# Patient Record
Sex: MALE | Race: BLACK OR AFRICAN AMERICAN | NOT HISPANIC OR LATINO | Employment: FULL TIME | URBAN - METROPOLITAN AREA
[De-identification: names, ages, dates, MRNs, and addresses within clinical notes are randomized per-mention and may not be internally consistent; named-entity substitution may affect disease eponyms.]

---

## 2024-01-17 PROBLEM — J45.909 ASTHMA (HHS-HCC): Status: ACTIVE | Noted: 2024-01-17

## 2024-01-17 PROBLEM — D72.819 LEUKOPENIA: Status: ACTIVE | Noted: 2020-12-15

## 2024-01-17 PROBLEM — E55.9 VITAMIN D DEFICIENCY: Status: ACTIVE | Noted: 2024-01-17

## 2024-01-17 ASSESSMENT — PROMIS GLOBAL HEALTH SCALE
RATE_MENTAL_HEALTH: EXCELLENT
CARRYOUT_SOCIAL_ACTIVITIES: EXCELLENT
EMOTIONAL_PROBLEMS: NEVER
RATE_PHYSICAL_HEALTH: VERY GOOD
RATE_QUALITY_OF_LIFE: VERY GOOD
RATE_AVERAGE_FATIGUE: MILD
RATE_AVERAGE_PAIN: 1
RATE_SOCIAL_SATISFACTION: EXCELLENT
CARRYOUT_PHYSICAL_ACTIVITIES: COMPLETELY
RATE_GENERAL_HEALTH: VERY GOOD

## 2024-01-18 ENCOUNTER — LAB (OUTPATIENT)
Dept: LAB | Facility: LAB | Age: 59
End: 2024-01-18
Payer: COMMERCIAL

## 2024-01-18 ENCOUNTER — OFFICE VISIT (OUTPATIENT)
Dept: PRIMARY CARE | Facility: CLINIC | Age: 59
End: 2024-01-18
Payer: COMMERCIAL

## 2024-01-18 VITALS
SYSTOLIC BLOOD PRESSURE: 118 MMHG | DIASTOLIC BLOOD PRESSURE: 80 MMHG | BODY MASS INDEX: 40.53 KG/M2 | TEMPERATURE: 97.6 F | RESPIRATION RATE: 16 BRPM | OXYGEN SATURATION: 95 % | HEART RATE: 62 BPM | WEIGHT: 252.2 LBS | HEIGHT: 66 IN

## 2024-01-18 DIAGNOSIS — Z12.5 PROSTATE CANCER SCREENING: ICD-10-CM

## 2024-01-18 DIAGNOSIS — J45.20 MILD INTERMITTENT ASTHMA WITHOUT COMPLICATION (HHS-HCC): ICD-10-CM

## 2024-01-18 DIAGNOSIS — Z23 FLU VACCINE NEED: ICD-10-CM

## 2024-01-18 DIAGNOSIS — Z00.00 ANNUAL PHYSICAL EXAM: Primary | ICD-10-CM

## 2024-01-18 DIAGNOSIS — E55.9 VITAMIN D DEFICIENCY: ICD-10-CM

## 2024-01-18 DIAGNOSIS — E66.01 CLASS 3 SEVERE OBESITY WITHOUT SERIOUS COMORBIDITY WITH BODY MASS INDEX (BMI) OF 40.0 TO 44.9 IN ADULT, UNSPECIFIED OBESITY TYPE (MULTI): ICD-10-CM

## 2024-01-18 LAB
25(OH)D3 SERPL-MCNC: 18 NG/ML (ref 30–100)
ALBUMIN SERPL BCP-MCNC: 4.4 G/DL (ref 3.4–5)
ALP SERPL-CCNC: 47 U/L (ref 33–120)
ALT SERPL W P-5'-P-CCNC: 42 U/L (ref 10–52)
ANION GAP SERPL CALC-SCNC: 13 MMOL/L (ref 10–20)
AST SERPL W P-5'-P-CCNC: 24 U/L (ref 9–39)
BASOPHILS # BLD AUTO: 0.03 X10*3/UL (ref 0–0.1)
BASOPHILS NFR BLD AUTO: 0.8 %
BILIRUB SERPL-MCNC: 0.6 MG/DL (ref 0–1.2)
BUN SERPL-MCNC: 12 MG/DL (ref 6–23)
CALCIUM SERPL-MCNC: 9.6 MG/DL (ref 8.6–10.6)
CHLORIDE SERPL-SCNC: 105 MMOL/L (ref 98–107)
CHOLEST SERPL-MCNC: 196 MG/DL (ref 0–199)
CHOLESTEROL/HDL RATIO: 3.4
CO2 SERPL-SCNC: 27 MMOL/L (ref 21–32)
CREAT SERPL-MCNC: 0.94 MG/DL (ref 0.5–1.3)
EGFRCR SERPLBLD CKD-EPI 2021: >90 ML/MIN/1.73M*2
EOSINOPHIL # BLD AUTO: 0.27 X10*3/UL (ref 0–0.7)
EOSINOPHIL NFR BLD AUTO: 7 %
ERYTHROCYTE [DISTWIDTH] IN BLOOD BY AUTOMATED COUNT: 13.8 % (ref 11.5–14.5)
EST. AVERAGE GLUCOSE BLD GHB EST-MCNC: 114 MG/DL
GLUCOSE SERPL-MCNC: 93 MG/DL (ref 74–99)
HBA1C MFR BLD: 5.6 %
HCT VFR BLD AUTO: 43.4 % (ref 41–52)
HDLC SERPL-MCNC: 57.9 MG/DL
HGB BLD-MCNC: 14.5 G/DL (ref 13.5–17.5)
IMM GRANULOCYTES # BLD AUTO: 0.01 X10*3/UL (ref 0–0.7)
IMM GRANULOCYTES NFR BLD AUTO: 0.3 % (ref 0–0.9)
LDLC SERPL CALC-MCNC: 127 MG/DL
LYMPHOCYTES # BLD AUTO: 1.86 X10*3/UL (ref 1.2–4.8)
LYMPHOCYTES NFR BLD AUTO: 48.2 %
MCH RBC QN AUTO: 31.8 PG (ref 26–34)
MCHC RBC AUTO-ENTMCNC: 33.4 G/DL (ref 32–36)
MCV RBC AUTO: 95 FL (ref 80–100)
MONOCYTES # BLD AUTO: 0.51 X10*3/UL (ref 0.1–1)
MONOCYTES NFR BLD AUTO: 13.2 %
NEUTROPHILS # BLD AUTO: 1.18 X10*3/UL (ref 1.2–7.7)
NEUTROPHILS NFR BLD AUTO: 30.5 %
NON HDL CHOLESTEROL: 138 MG/DL (ref 0–149)
NRBC BLD-RTO: 0 /100 WBCS (ref 0–0)
PLATELET # BLD AUTO: 199 X10*3/UL (ref 150–450)
POTASSIUM SERPL-SCNC: 4.2 MMOL/L (ref 3.5–5.3)
PROT SERPL-MCNC: 7.7 G/DL (ref 6.4–8.2)
PSA SERPL-MCNC: 0.3 NG/ML
RBC # BLD AUTO: 4.56 X10*6/UL (ref 4.5–5.9)
SODIUM SERPL-SCNC: 141 MMOL/L (ref 136–145)
TRIGL SERPL-MCNC: 55 MG/DL (ref 0–149)
VLDL: 11 MG/DL (ref 0–40)
WBC # BLD AUTO: 3.9 X10*3/UL (ref 4.4–11.3)

## 2024-01-18 PROCEDURE — 90471 IMMUNIZATION ADMIN: CPT | Performed by: STUDENT IN AN ORGANIZED HEALTH CARE EDUCATION/TRAINING PROGRAM

## 2024-01-18 PROCEDURE — 84153 ASSAY OF PSA TOTAL: CPT

## 2024-01-18 PROCEDURE — 85025 COMPLETE CBC W/AUTO DIFF WBC: CPT

## 2024-01-18 PROCEDURE — 83036 HEMOGLOBIN GLYCOSYLATED A1C: CPT

## 2024-01-18 PROCEDURE — 4004F PT TOBACCO SCREEN RCVD TLK: CPT | Performed by: STUDENT IN AN ORGANIZED HEALTH CARE EDUCATION/TRAINING PROGRAM

## 2024-01-18 PROCEDURE — 82306 VITAMIN D 25 HYDROXY: CPT

## 2024-01-18 PROCEDURE — 3008F BODY MASS INDEX DOCD: CPT | Performed by: STUDENT IN AN ORGANIZED HEALTH CARE EDUCATION/TRAINING PROGRAM

## 2024-01-18 PROCEDURE — 80061 LIPID PANEL: CPT

## 2024-01-18 PROCEDURE — 90686 IIV4 VACC NO PRSV 0.5 ML IM: CPT | Performed by: STUDENT IN AN ORGANIZED HEALTH CARE EDUCATION/TRAINING PROGRAM

## 2024-01-18 PROCEDURE — 80053 COMPREHEN METABOLIC PANEL: CPT

## 2024-01-18 PROCEDURE — 99396 PREV VISIT EST AGE 40-64: CPT | Performed by: STUDENT IN AN ORGANIZED HEALTH CARE EDUCATION/TRAINING PROGRAM

## 2024-01-18 PROCEDURE — 36415 COLL VENOUS BLD VENIPUNCTURE: CPT

## 2024-01-18 RX ORDER — ALBUTEROL SULFATE 90 UG/1
1 AEROSOL, METERED RESPIRATORY (INHALATION) EVERY 4 HOURS PRN
COMMUNITY
Start: 2020-04-30 | End: 2024-01-18 | Stop reason: SDUPTHER

## 2024-01-18 RX ORDER — ACETAMINOPHEN 500 MG
1 TABLET ORAL DAILY
COMMUNITY
Start: 2022-01-07

## 2024-01-18 RX ORDER — ALBUTEROL SULFATE 90 UG/1
1 AEROSOL, METERED RESPIRATORY (INHALATION) EVERY 4 HOURS PRN
Qty: 18 G | Refills: 2 | Status: SHIPPED | OUTPATIENT
Start: 2024-01-18

## 2024-01-18 ASSESSMENT — PATIENT HEALTH QUESTIONNAIRE - PHQ9
2. FEELING DOWN, DEPRESSED OR HOPELESS: NOT AT ALL
1. LITTLE INTEREST OR PLEASURE IN DOING THINGS: NOT AT ALL
SUM OF ALL RESPONSES TO PHQ9 QUESTIONS 1 AND 2: 0

## 2024-01-18 NOTE — PATIENT INSTRUCTIONS
Flu shot today.   Continue with current medications.  Blood work before your next visit.  If you receive medical information from My Chart, your results will be released into your online chart. This means you may view or see results before someone from our office contact you directly.  Please keep in mind that if blood work or imaging were ordered during your visit, all the nonurgent lab results will be discussed with you at your next office visit.  Please arrive 15 minutes before your appointment.   Return to office in 12 months for annual physical or as needed

## 2024-01-18 NOTE — PROGRESS NOTES
Subjective   Patient ID: Rachid Vargas is a pleasant 58 y.o. male w hx of asthma, HTN, who presents for Annual Exam.  HPI    Health Maintenance:  -   Colonoscopy: cologaurd , negative   -  Prostate Cancer Screenin  - AAA Screening: at 65  - Lung Cancer Screening: NA     Immunizations:  - COVID vaccination status:  - Influenza: Today   - Shingles: UTD   - TDAP: recommended   - Pneumo Vaccine: recommended     Review of Systems   All other systems reviewed and are negative.      Visit Vitals  /80   Pulse 62   Temp 36.4 °C (97.6 °F)   Resp 16          Objective   Physical Exam  Constitutional:       General: He is not in acute distress.     Appearance: Normal appearance. He is obese.   HENT:      Head: Normocephalic and atraumatic.   Eyes:      General: No scleral icterus.     Conjunctiva/sclera: Conjunctivae normal.   Cardiovascular:      Rate and Rhythm: Normal rate and regular rhythm.      Heart sounds: Normal heart sounds.   Pulmonary:      Effort: Pulmonary effort is normal.      Breath sounds: Normal breath sounds. No wheezing.   Abdominal:      General: Bowel sounds are normal. There is no distension.      Palpations: Abdomen is soft.      Tenderness: There is no abdominal tenderness.   Musculoskeletal:      Cervical back: Neck supple.      Right lower leg: No edema.      Left lower leg: No edema.   Lymphadenopathy:      Cervical: No cervical adenopathy.   Skin:     General: Skin is warm and dry.   Neurological:      General: No focal deficit present.      Mental Status: He is alert and oriented to person, place, and time.   Psychiatric:         Mood and Affect: Mood normal.         Behavior: Behavior normal.         Assessment/Plan   Problem List Items Addressed This Visit       Asthma     Stable. No recent exacerbation          Relevant Medications    albuterol 90 mcg/actuation inhaler    Vitamin D deficiency    Relevant Orders    Vitamin D 25-Hydroxy,Total (for eval of Vitamin D levels)      Other Visit Diagnoses       Annual physical exam    -  Primary    Prostate cancer screening        Relevant Orders    Prostate Spec.Ag,Screen    Class 3 severe obesity without serious comorbidity with body mass index (BMI) of 40.0 to 44.9 in adult, unspecified obesity type (CMS/HCC)        Relevant Orders    Comprehensive Metabolic Panel    CBC and Auto Differential    Hemoglobin A1C    Lipid Panel    Flu vaccine need        Relevant Orders    Flu vaccine (IIV4) age 6 months and greater, preservative free (Completed)

## 2024-01-19 DIAGNOSIS — E55.9 VITAMIN D DEFICIENCY: Primary | ICD-10-CM

## 2024-01-19 RX ORDER — ERGOCALCIFEROL 1.25 MG/1
50000 CAPSULE ORAL
Qty: 8 CAPSULE | Refills: 0 | Status: SHIPPED | OUTPATIENT
Start: 2024-01-19 | End: 2024-03-15

## 2024-09-10 ENCOUNTER — APPOINTMENT (OUTPATIENT)
Dept: PRIMARY CARE | Facility: CLINIC | Age: 59
End: 2024-09-10
Payer: COMMERCIAL

## 2024-09-10 ENCOUNTER — LAB (OUTPATIENT)
Dept: LAB | Facility: LAB | Age: 59
End: 2024-09-10
Payer: COMMERCIAL

## 2024-09-10 VITALS
HEART RATE: 63 BPM | WEIGHT: 246 LBS | SYSTOLIC BLOOD PRESSURE: 118 MMHG | DIASTOLIC BLOOD PRESSURE: 72 MMHG | HEIGHT: 66 IN | BODY MASS INDEX: 39.53 KG/M2

## 2024-09-10 DIAGNOSIS — E66.09 CLASS 2 OBESITY DUE TO EXCESS CALORIES WITHOUT SERIOUS COMORBIDITY WITH BODY MASS INDEX (BMI) OF 39.0 TO 39.9 IN ADULT: ICD-10-CM

## 2024-09-10 DIAGNOSIS — Z13.29 SCREENING FOR THYROID DISORDER: ICD-10-CM

## 2024-09-10 DIAGNOSIS — D72.819 LEUKOPENIA, UNSPECIFIED TYPE: ICD-10-CM

## 2024-09-10 DIAGNOSIS — Z13.89 SCREENING FOR OBESITY: ICD-10-CM

## 2024-09-10 DIAGNOSIS — Z13.220 SCREENING FOR HYPERLIPIDEMIA: ICD-10-CM

## 2024-09-10 DIAGNOSIS — R07.89 OTHER CHEST PAIN: Primary | Chronic | ICD-10-CM

## 2024-09-10 DIAGNOSIS — Z13.6 SCREENING, ISCHEMIC HEART DISEASE: ICD-10-CM

## 2024-09-10 DIAGNOSIS — Z13.1 SCREENING FOR DIABETES MELLITUS: ICD-10-CM

## 2024-09-10 PROBLEM — E66.812 CLASS 2 OBESITY DUE TO EXCESS CALORIES WITHOUT SERIOUS COMORBIDITY WITH BODY MASS INDEX (BMI) OF 39.0 TO 39.9 IN ADULT: Status: ACTIVE | Noted: 2024-09-10

## 2024-09-10 LAB
BASOPHILS # BLD AUTO: 0.03 X10*3/UL (ref 0–0.1)
BASOPHILS NFR BLD AUTO: 0.8 %
EOSINOPHIL # BLD AUTO: 0.18 X10*3/UL (ref 0–0.7)
EOSINOPHIL NFR BLD AUTO: 4.8 %
ERYTHROCYTE [DISTWIDTH] IN BLOOD BY AUTOMATED COUNT: 13.2 % (ref 11.5–14.5)
EST. AVERAGE GLUCOSE BLD GHB EST-MCNC: 114 MG/DL
HBA1C MFR BLD: 5.6 %
HCT VFR BLD AUTO: 40.8 % (ref 41–52)
HGB BLD-MCNC: 13.8 G/DL (ref 13.5–17.5)
IMM GRANULOCYTES # BLD AUTO: 0 X10*3/UL (ref 0–0.7)
IMM GRANULOCYTES NFR BLD AUTO: 0 % (ref 0–0.9)
LYMPHOCYTES # BLD AUTO: 1.97 X10*3/UL (ref 1.2–4.8)
LYMPHOCYTES NFR BLD AUTO: 52.4 %
MCH RBC QN AUTO: 31.9 PG (ref 26–34)
MCHC RBC AUTO-ENTMCNC: 33.8 G/DL (ref 32–36)
MCV RBC AUTO: 94 FL (ref 80–100)
MONOCYTES # BLD AUTO: 0.43 X10*3/UL (ref 0.1–1)
MONOCYTES NFR BLD AUTO: 11.4 %
NEUTROPHILS # BLD AUTO: 1.15 X10*3/UL (ref 1.2–7.7)
NEUTROPHILS NFR BLD AUTO: 30.6 %
NRBC BLD-RTO: 0 /100 WBCS (ref 0–0)
PLATELET # BLD AUTO: 178 X10*3/UL (ref 150–450)
RBC # BLD AUTO: 4.33 X10*6/UL (ref 4.5–5.9)
WBC # BLD AUTO: 3.8 X10*3/UL (ref 4.4–11.3)

## 2024-09-10 PROCEDURE — 36415 COLL VENOUS BLD VENIPUNCTURE: CPT

## 2024-09-10 PROCEDURE — 84443 ASSAY THYROID STIM HORMONE: CPT

## 2024-09-10 PROCEDURE — 80061 LIPID PANEL: CPT

## 2024-09-10 PROCEDURE — 3008F BODY MASS INDEX DOCD: CPT | Performed by: INTERNAL MEDICINE

## 2024-09-10 PROCEDURE — 85025 COMPLETE CBC W/AUTO DIFF WBC: CPT

## 2024-09-10 PROCEDURE — 4004F PT TOBACCO SCREEN RCVD TLK: CPT | Performed by: INTERNAL MEDICINE

## 2024-09-10 PROCEDURE — 93000 ELECTROCARDIOGRAM COMPLETE: CPT | Performed by: INTERNAL MEDICINE

## 2024-09-10 PROCEDURE — 99213 OFFICE O/P EST LOW 20 MIN: CPT | Performed by: INTERNAL MEDICINE

## 2024-09-10 PROCEDURE — 83036 HEMOGLOBIN GLYCOSYLATED A1C: CPT

## 2024-09-10 ASSESSMENT — PATIENT HEALTH QUESTIONNAIRE - PHQ9
1. LITTLE INTEREST OR PLEASURE IN DOING THINGS: NOT AT ALL
SUM OF ALL RESPONSES TO PHQ9 QUESTIONS 1 AND 2: 0
2. FEELING DOWN, DEPRESSED OR HOPELESS: NOT AT ALL

## 2024-09-10 ASSESSMENT — ENCOUNTER SYMPTOMS: CONSTITUTIONAL NEGATIVE: 1

## 2024-09-10 NOTE — PROGRESS NOTES
"Patient ID: Rachid Vargas is a 59 y.o. male who presents for New Patient Visit (Urgent Care follow up).    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    All other systems reviewed and are negative.      Objective     Physical Exam  Vitals reviewed.   Constitutional:       Appearance: He is obese.   Neck:      Vascular: No carotid bruit.   Cardiovascular:      Rate and Rhythm: Normal rate and regular rhythm.      Pulses: Normal pulses.      Heart sounds: Normal heart sounds. No murmur heard.  Pulmonary:      Effort: Pulmonary effort is normal.      Breath sounds: Normal breath sounds.      Comments: NO CHEST WALL TENDERNESS  Abdominal:      Comments: obese   Musculoskeletal:      Right lower leg: No edema.      Left lower leg: No edema.   Skin:     Capillary Refill: Capillary refill takes more than 3 seconds.   Neurological:      General: No focal deficit present.      Mental Status: He is oriented to person, place, and time. Mental status is at baseline.   Psychiatric:         Mood and Affect: Mood normal.         Behavior: Behavior normal.         Thought Content: Thought content normal.         Judgment: Judgment normal.         Lab Results   Component Value Date    WBC 3.9 (L) 01/18/2024    HGB 14.5 01/18/2024    HCT 43.4 01/18/2024    MCV 95 01/18/2024     01/18/2024           Problem List Items Addressed This Visit       Leukopenia    Relevant Orders    CBC and Auto " Differential    Class 2 obesity due to excess calories without serious comorbidity with body mass index (BMI) of 39.0 to 39.9 in adult    Screening for obesity     Other Visit Diagnoses       Other chest pain  (Chronic)   -  Primary    Relevant Orders    Stress Test    Screening for hyperlipidemia        Relevant Orders    Lipid panel    Screening for diabetes mellitus        Relevant Orders    Hemoglobin A1c    Screening, ischemic heart disease        Relevant Orders    CT cardiac scoring wo IV contrast               A/P         EKG NORMAL A1C  CMP, LIPID, TSH   CARDIAC STRESS TEST   CARDIAC CT W/O IV CONTRAST   FOLLOW UP IF NEEDED

## 2024-09-11 ENCOUNTER — CLINICAL SUPPORT (OUTPATIENT)
Dept: CARDIOLOGY | Facility: CLINIC | Age: 59
End: 2024-09-11
Payer: COMMERCIAL

## 2024-09-11 DIAGNOSIS — R07.89 OTHER CHEST PAIN: Chronic | ICD-10-CM

## 2024-09-11 LAB
CHOLEST SERPL-MCNC: 176 MG/DL (ref 0–199)
CHOLESTEROL/HDL RATIO: 2.9
HDLC SERPL-MCNC: 61.7 MG/DL
LDLC SERPL CALC-MCNC: 105 MG/DL
NON HDL CHOLESTEROL: 114 MG/DL (ref 0–149)
TRIGL SERPL-MCNC: 49 MG/DL (ref 0–149)
TSH SERPL-ACNC: 1.87 MIU/L (ref 0.44–3.98)
VLDL: 10 MG/DL (ref 0–40)

## 2024-09-11 PROCEDURE — 93017 CV STRESS TEST TRACING ONLY: CPT

## 2024-09-11 PROCEDURE — 93018 CV STRESS TEST I&R ONLY: CPT | Performed by: INTERNAL MEDICINE

## 2024-09-11 PROCEDURE — 93016 CV STRESS TEST SUPVJ ONLY: CPT | Performed by: INTERNAL MEDICINE

## 2024-09-15 ENCOUNTER — HOSPITAL ENCOUNTER (OUTPATIENT)
Dept: RADIOLOGY | Facility: HOSPITAL | Age: 59
Discharge: HOME | End: 2024-09-15
Payer: COMMERCIAL

## 2024-09-15 DIAGNOSIS — Z13.6 SCREENING, ISCHEMIC HEART DISEASE: ICD-10-CM

## 2024-09-15 PROCEDURE — 75571 CT HRT W/O DYE W/CA TEST: CPT

## 2024-10-02 DIAGNOSIS — D80.8 LIGHT CHAIN DISEASE (MULTI): ICD-10-CM

## 2024-10-02 DIAGNOSIS — D72.819 LEUKOPENIA, UNSPECIFIED TYPE: Primary | ICD-10-CM

## 2024-10-02 DIAGNOSIS — D64.9 ANEMIA, UNSPECIFIED TYPE: ICD-10-CM

## 2024-10-10 ENCOUNTER — LAB (OUTPATIENT)
Dept: LAB | Facility: LAB | Age: 59
End: 2024-10-10
Payer: COMMERCIAL

## 2024-10-10 DIAGNOSIS — D64.9 ANEMIA, UNSPECIFIED TYPE: ICD-10-CM

## 2024-10-10 DIAGNOSIS — D72.819 LEUKOPENIA, UNSPECIFIED TYPE: ICD-10-CM

## 2024-10-10 DIAGNOSIS — D80.8 LIGHT CHAIN DISEASE (MULTI): ICD-10-CM

## 2024-10-10 LAB
FERRITIN SERPL-MCNC: 754 NG/ML (ref 20–300)
HGB RETIC QN: 38 PG (ref 28–38)
IMMATURE RETIC FRACTION: 13.6 %
IRON SATN MFR SERPL: 26 % (ref 25–45)
IRON SERPL-MCNC: 100 UG/DL (ref 35–150)
LDH SERPL L TO P-CCNC: 139 U/L (ref 84–246)
PROT SERPL-MCNC: 8 G/DL (ref 6.4–8.2)
RETICS #: 0.09 X10*6/UL (ref 0.02–0.12)
RETICS/RBC NFR AUTO: 2 % (ref 0.5–2)
TIBC SERPL-MCNC: 378 UG/DL (ref 240–445)
UIBC SERPL-MCNC: 278 UG/DL (ref 110–370)
VIT B12 SERPL-MCNC: 495 PG/ML (ref 211–911)

## 2024-10-10 PROCEDURE — 82607 VITAMIN B-12: CPT

## 2024-10-10 PROCEDURE — 85045 AUTOMATED RETICULOCYTE COUNT: CPT

## 2024-10-10 PROCEDURE — 83521 IG LIGHT CHAINS FREE EACH: CPT

## 2024-10-10 PROCEDURE — 83615 LACTATE (LD) (LDH) ENZYME: CPT

## 2024-10-10 PROCEDURE — 36415 COLL VENOUS BLD VENIPUNCTURE: CPT

## 2024-10-10 PROCEDURE — 84155 ASSAY OF PROTEIN SERUM: CPT

## 2024-10-10 PROCEDURE — 86038 ANTINUCLEAR ANTIBODIES: CPT

## 2024-10-10 PROCEDURE — 83540 ASSAY OF IRON: CPT

## 2024-10-10 PROCEDURE — 83550 IRON BINDING TEST: CPT

## 2024-10-10 PROCEDURE — 82728 ASSAY OF FERRITIN: CPT

## 2024-10-10 PROCEDURE — 84165 PROTEIN E-PHORESIS SERUM: CPT

## 2024-10-11 LAB
ANA SER QL HEP2 SUBST: NEGATIVE
KAPPA LC SERPL-MCNC: 2.53 MG/DL (ref 0.33–1.94)
KAPPA LC/LAMBDA SER: 1.72 {RATIO} (ref 0.26–1.65)
LAMBDA LC SERPL-MCNC: 1.47 MG/DL (ref 0.57–2.63)

## 2024-10-13 LAB
ALBUMIN: 4.5 G/DL (ref 3.4–5)
ALPHA 1 GLOBULIN: 0.2 G/DL (ref 0.2–0.6)
ALPHA 2 GLOBULIN: 0.7 G/DL (ref 0.4–1.1)
BETA GLOBULIN: 0.9 G/DL (ref 0.5–1.2)
GAMMA GLOBULIN: 1.6 G/DL (ref 0.5–1.4)
PATH REVIEW-SERUM PROTEIN ELECTROPHORESIS: ABNORMAL
PROTEIN ELECTROPHORESIS COMMENT: ABNORMAL

## 2024-10-14 DIAGNOSIS — D80.8 LIGHT CHAIN DISEASE (MULTI): Primary | ICD-10-CM

## 2025-01-27 ENCOUNTER — APPOINTMENT (OUTPATIENT)
Dept: PRIMARY CARE | Facility: CLINIC | Age: 60
End: 2025-01-27
Payer: COMMERCIAL

## 2025-01-27 ENCOUNTER — OFFICE VISIT (OUTPATIENT)
Dept: HEMATOLOGY/ONCOLOGY | Facility: CLINIC | Age: 60
End: 2025-01-27
Payer: COMMERCIAL

## 2025-01-27 VITALS
HEIGHT: 69 IN | SYSTOLIC BLOOD PRESSURE: 141 MMHG | DIASTOLIC BLOOD PRESSURE: 91 MMHG | BODY MASS INDEX: 36.64 KG/M2 | WEIGHT: 247.36 LBS | RESPIRATION RATE: 18 BRPM | TEMPERATURE: 97 F | OXYGEN SATURATION: 96 % | HEART RATE: 86 BPM

## 2025-01-27 DIAGNOSIS — R79.89 ELEVATED FERRITIN: ICD-10-CM

## 2025-01-27 DIAGNOSIS — D80.8 LIGHT CHAIN DISEASE (MULTI): ICD-10-CM

## 2025-01-27 LAB
BASOPHILS # BLD AUTO: 0.03 X10*3/UL (ref 0–0.1)
BASOPHILS NFR BLD AUTO: 0.7 %
EOSINOPHIL # BLD AUTO: 0.18 X10*3/UL (ref 0–0.7)
EOSINOPHIL NFR BLD AUTO: 4.2 %
ERYTHROCYTE [DISTWIDTH] IN BLOOD BY AUTOMATED COUNT: 13.1 % (ref 11.5–14.5)
HCT VFR BLD AUTO: 41.1 % (ref 41–52)
HGB BLD-MCNC: 14.5 G/DL (ref 13.5–17.5)
IMM GRANULOCYTES # BLD AUTO: 0 X10*3/UL (ref 0–0.7)
IMM GRANULOCYTES NFR BLD AUTO: 0 % (ref 0–0.9)
LYMPHOCYTES # BLD AUTO: 2.1 X10*3/UL (ref 1.2–4.8)
LYMPHOCYTES NFR BLD AUTO: 48.6 %
MCH RBC QN AUTO: 32.8 PG (ref 26–34)
MCHC RBC AUTO-ENTMCNC: 35.3 G/DL (ref 32–36)
MCV RBC AUTO: 93 FL (ref 80–100)
MONOCYTES # BLD AUTO: 0.44 X10*3/UL (ref 0.1–1)
MONOCYTES NFR BLD AUTO: 10.2 %
NEUTROPHILS # BLD AUTO: 1.57 X10*3/UL (ref 1.2–7.7)
NEUTROPHILS NFR BLD AUTO: 36.3 %
NRBC BLD-RTO: 0 /100 WBCS (ref 0–0)
PLATELET # BLD AUTO: 184 X10*3/UL (ref 150–450)
RBC # BLD AUTO: 4.42 X10*6/UL (ref 4.5–5.9)
WBC # BLD AUTO: 4.3 X10*3/UL (ref 4.4–11.3)

## 2025-01-27 PROCEDURE — 99214 OFFICE O/P EST MOD 30 MIN: CPT | Performed by: NURSE PRACTITIONER

## 2025-01-27 PROCEDURE — 84155 ASSAY OF PROTEIN SERUM: CPT | Performed by: NURSE PRACTITIONER

## 2025-01-27 PROCEDURE — 82728 ASSAY OF FERRITIN: CPT | Performed by: NURSE PRACTITIONER

## 2025-01-27 PROCEDURE — 86334 IMMUNOFIX E-PHORESIS SERUM: CPT | Performed by: NURSE PRACTITIONER

## 2025-01-27 PROCEDURE — 85025 COMPLETE CBC W/AUTO DIFF WBC: CPT | Performed by: NURSE PRACTITIONER

## 2025-01-27 PROCEDURE — 83521 IG LIGHT CHAINS FREE EACH: CPT | Performed by: NURSE PRACTITIONER

## 2025-01-27 PROCEDURE — 3008F BODY MASS INDEX DOCD: CPT | Performed by: NURSE PRACTITIONER

## 2025-01-27 PROCEDURE — 99204 OFFICE O/P NEW MOD 45 MIN: CPT | Performed by: NURSE PRACTITIONER

## 2025-01-27 PROCEDURE — 82784 ASSAY IGA/IGD/IGG/IGM EACH: CPT | Performed by: NURSE PRACTITIONER

## 2025-01-27 ASSESSMENT — ENCOUNTER SYMPTOMS
OCCASIONAL FEELINGS OF UNSTEADINESS: 0
DEPRESSION: 0
LOSS OF SENSATION IN FEET: 0

## 2025-01-27 ASSESSMENT — PATIENT HEALTH QUESTIONNAIRE - PHQ9
SUM OF ALL RESPONSES TO PHQ9 QUESTIONS 1 AND 2: 0
2. FEELING DOWN, DEPRESSED OR HOPELESS: NOT AT ALL
1. LITTLE INTEREST OR PLEASURE IN DOING THINGS: NOT AT ALL

## 2025-01-27 ASSESSMENT — COLUMBIA-SUICIDE SEVERITY RATING SCALE - C-SSRS
2. HAVE YOU ACTUALLY HAD ANY THOUGHTS OF KILLING YOURSELF?: NO
1. IN THE PAST MONTH, HAVE YOU WISHED YOU WERE DEAD OR WISHED YOU COULD GO TO SLEEP AND NOT WAKE UP?: NO
6. HAVE YOU EVER DONE ANYTHING, STARTED TO DO ANYTHING, OR PREPARED TO DO ANYTHING TO END YOUR LIFE?: NO

## 2025-01-27 ASSESSMENT — PAIN SCALES - GENERAL: PAINLEVEL_OUTOF10: 0-NO PAIN

## 2025-01-27 NOTE — PROGRESS NOTES
"Patient ID: Rachid Vargas is a 59 y.o. male.  Referring Physician: Jay Steele MD  [address]  [address]  [place] [address]  Primary Care Provider: Estrella Kaplan MD  Visit Type: Initial Visit      Subjective    HPI  61yo referral for \"light chain\" with labs 10/10/24 kappa 2.53, ratio 1.72.  CESILIA negative, ferritan 754, iron 100, % sat 16 and B12 of 495.   Prior to that 9/10/24 WBC 3.8, hgb 13.8 and plt 175.  He states his WBC has been low for over 30 years.  He is completely asymptomatic with good energy, no weight loss, no headaches, no night sweats and no recurrent infections.  He denies back and bone pain.  He denies cough, congestion, abdominal pain, bloating, diarrhea or constipation.     Review of Systems - Oncology Asymptomatic    Objective   BSA: 2.33 meters squared  BP (!) 141/91 (BP Location: Left arm, Patient Position: Sitting, BP Cuff Size: Large adult long) Comment: stef aware  Pulse 86   Temp 36.1 °C (97 °F) (Temporal)   Resp 18   Ht 1.743 m (5' 8.62\")   Wt 112 kg (247 lb 5.7 oz)   SpO2 96%   BMI 36.93 kg/m²      has a past medical history of Other specified health status.   has a past surgical history that includes Other surgical history (01/04/2022).  No family history on file.      Rachid Vargas  reports that he has been smoking cigars. He has never used smokeless tobacco.  He  reports that he does not currently use alcohol.  He  reports no history of drug use.    Physical Exam  Constitutional:       Appearance: Normal appearance.   Eyes:      Conjunctiva/sclera: Conjunctivae normal.      Pupils: Pupils are equal, round, and reactive to light.   Cardiovascular:      Rate and Rhythm: Normal rate and regular rhythm.      Pulses: Normal pulses.      Heart sounds: Normal heart sounds. No murmur heard.  Pulmonary:      Effort: Pulmonary effort is normal. No respiratory distress.      Breath sounds: Normal breath sounds. No stridor. No wheezing or rhonchi.   Abdominal:      " General: There is no distension.      Palpations: Abdomen is soft.      Tenderness: There is no abdominal tenderness.   Musculoskeletal:         General: No swelling, tenderness or deformity. Normal range of motion.   Lymphadenopathy:      Cervical: No cervical adenopathy.   Skin:     General: Skin is dry.      Coloration: Skin is not jaundiced or pale.      Findings: No bruising or erythema.   Neurological:      Motor: No weakness.     WBC   Date/Time Value Ref Range Status   09/10/2024 02:32 PM 3.8 (L) 4.4 - 11.3 x10*3/uL Final   01/18/2024 07:52 AM 3.9 (L) 4.4 - 11.3 x10*3/uL Final   01/05/2023 09:00 AM 3.4 (L) 4.4 - 11.3 x10E9/L Final   01/04/2022 09:00 AM 4.0 (L) 4.4 - 11.3 x10E9/L Final     nRBC   Date Value Ref Range Status   09/10/2024 0.0 0.0 - 0.0 /100 WBCs Final   01/18/2024 0.0 0.0 - 0.0 /100 WBCs Final   01/05/2023 0.0 0.0 - 0.0 /100 WBC Final   01/04/2022 0.0 0.0 - 0.0 /100 WBC Final     RBC   Date Value Ref Range Status   09/10/2024 4.33 (L) 4.50 - 5.90 x10*6/uL Final   01/18/2024 4.56 4.50 - 5.90 x10*6/uL Final   01/05/2023 4.28 (L) 4.50 - 5.90 x10E12/L Final   01/04/2022 4.34 (L) 4.50 - 5.90 x10E12/L Final     Hemoglobin   Date Value Ref Range Status   09/10/2024 13.8 13.5 - 17.5 g/dL Final   01/18/2024 14.5 13.5 - 17.5 g/dL Final   01/05/2023 13.6 13.5 - 17.5 g/dL Final   01/04/2022 14.0 13.5 - 17.5 g/dL Final     Hematocrit   Date Value Ref Range Status   09/10/2024 40.8 (L) 41.0 - 52.0 % Final   01/18/2024 43.4 41.0 - 52.0 % Final   01/05/2023 40.9 (L) 41.0 - 52.0 % Final   01/04/2022 41.7 41.0 - 52.0 % Final     MCV   Date/Time Value Ref Range Status   09/10/2024 02:32 PM 94 80 - 100 fL Final   01/18/2024 07:52 AM 95 80 - 100 fL Final   01/05/2023 09:00 AM 96 80 - 100 fL Final   01/04/2022 09:00 AM 96 80 - 100 fL Final     MCH   Date/Time Value Ref Range Status   09/10/2024 02:32 PM 31.9 26.0 - 34.0 pg Final   01/18/2024 07:52 AM 31.8 26.0 - 34.0 pg Final     MCHC   Date/Time Value Ref Range  "Status   09/10/2024 02:32 PM 33.8 32.0 - 36.0 g/dL Final   01/18/2024 07:52 AM 33.4 32.0 - 36.0 g/dL Final   01/05/2023 09:00 AM 33.3 32.0 - 36.0 g/dL Final   01/04/2022 09:00 AM 33.6 32.0 - 36.0 g/dL Final     RDW   Date/Time Value Ref Range Status   09/10/2024 02:32 PM 13.2 11.5 - 14.5 % Final   01/18/2024 07:52 AM 13.8 11.5 - 14.5 % Final   01/05/2023 09:00 AM 13.5 11.5 - 14.5 % Final   01/04/2022 09:00 AM 13.7 11.5 - 14.5 % Final     Platelets   Date/Time Value Ref Range Status   09/10/2024 02:32  150 - 450 x10*3/uL Final   01/18/2024 07:52  150 - 450 x10*3/uL Final   01/05/2023 09:00  150 - 450 x10E9/L Final   01/04/2022 09:00  150 - 450 x10E9/L Final     No results found for: \"MPV\"  Neutrophils %   Date/Time Value Ref Range Status   09/10/2024 02:32 PM 30.6 40.0 - 80.0 % Final   01/18/2024 07:52 AM 30.5 40.0 - 80.0 % Final   01/05/2023 09:00 AM 33.5 40.0 - 80.0 % Final   01/04/2022 09:00 AM 30.7 40.0 - 80.0 % Final     Immature Granulocytes %, Automated   Date/Time Value Ref Range Status   09/10/2024 02:32 PM 0.0 0.0 - 0.9 % Final     Comment:     Immature Granulocyte Count (IG) includes promyelocytes, myelocytes and metamyelocytes but does not include bands. Percent differential counts (%) should be interpreted in the context of the absolute cell counts (cells/UL).   01/18/2024 07:52 AM 0.3 0.0 - 0.9 % Final     Comment:     Immature Granulocyte Count (IG) includes promyelocytes, myelocytes and metamyelocytes but does not include bands. Percent differential counts (%) should be interpreted in the context of the absolute cell counts (cells/UL).   01/05/2023 09:00 AM 0.0 0.0 - 0.9 % Final     Comment:      Immature Granulocyte Count (IG) includes promyelocytes,    myelocytes and metamyelocytes but does not include bands.   Percent differential counts (%) should be interpreted in the   context of the absolute cell counts (cells/L).     01/04/2022 09:00 AM 0.0 0.0 - 0.9 % Final     " Comment:      Immature Granulocyte Count (IG) includes promyelocytes,    myelocytes and metamyelocytes but does not include bands.   Percent differential counts (%) should be interpreted in the   context of the absolute cell counts (cells/L).       Lymphocytes %   Date/Time Value Ref Range Status   09/10/2024 02:32 PM 52.4 13.0 - 44.0 % Final   01/18/2024 07:52 AM 48.2 13.0 - 44.0 % Final   01/05/2023 09:00 AM 48.5 13.0 - 44.0 % Final   01/04/2022 09:00 AM 46.5 13.0 - 44.0 % Final     Monocytes %   Date/Time Value Ref Range Status   09/10/2024 02:32 PM 11.4 2.0 - 10.0 % Final   01/18/2024 07:52 AM 13.2 2.0 - 10.0 % Final   01/05/2023 09:00 AM 12.1 2.0 - 10.0 % Final   01/04/2022 09:00 AM 13.9 2.0 - 10.0 % Final     Eosinophils %   Date/Time Value Ref Range Status   09/10/2024 02:32 PM 4.8 0.0 - 6.0 % Final   01/18/2024 07:52 AM 7.0 0.0 - 6.0 % Final   01/05/2023 09:00 AM 5.3 0.0 - 6.0 % Final   01/04/2022 09:00 AM 8.2 0.0 - 6.0 % Final     Basophils %   Date/Time Value Ref Range Status   09/10/2024 02:32 PM 0.8 0.0 - 2.0 % Final   01/18/2024 07:52 AM 0.8 0.0 - 2.0 % Final   01/05/2023 09:00 AM 0.6 0.0 - 2.0 % Final   01/04/2022 09:00 AM 0.7 0.0 - 2.0 % Final     Neutrophils Absolute   Date/Time Value Ref Range Status   09/10/2024 02:32 PM 1.15 (L) 1.20 - 7.70 x10*3/uL Final     Comment:     Percent differential counts (%) should be interpreted in the context of the absolute cell counts (cells/uL).   01/18/2024 07:52 AM 1.18 (L) 1.20 - 7.70 x10*3/uL Final     Comment:     Percent differential counts (%) should be interpreted in the context of the absolute cell counts (cells/uL).   01/05/2023 09:00 AM 1.14 (L) 1.20 - 7.70 x10E9/L Final   01/04/2022 09:00 AM 1.23 1.20 - 7.70 x10E9/L Final     Immature Granulocytes Absolute, Automated   Date/Time Value Ref Range Status   09/10/2024 02:32 PM 0.00 0.00 - 0.70 x10*3/uL Final   01/18/2024 07:52 AM 0.01 0.00 - 0.70 x10*3/uL Final     Lymphocytes Absolute   Date/Time Value  Ref Range Status   09/10/2024 02:32 PM 1.97 1.20 - 4.80 x10*3/uL Final   01/18/2024 07:52 AM 1.86 1.20 - 4.80 x10*3/uL Final   01/05/2023 09:00 AM 1.65 1.20 - 4.80 x10E9/L Final   01/04/2022 09:00 AM 1.87 1.20 - 4.80 x10E9/L Final     Monocytes Absolute   Date/Time Value Ref Range Status   09/10/2024 02:32 PM 0.43 0.10 - 1.00 x10*3/uL Final   01/18/2024 07:52 AM 0.51 0.10 - 1.00 x10*3/uL Final   01/05/2023 09:00 AM 0.41 0.10 - 1.00 x10E9/L Final   01/04/2022 09:00 AM 0.56 0.10 - 1.00 x10E9/L Final     Eosinophils Absolute   Date/Time Value Ref Range Status   09/10/2024 02:32 PM 0.18 0.00 - 0.70 x10*3/uL Final   01/18/2024 07:52 AM 0.27 0.00 - 0.70 x10*3/uL Final   01/05/2023 09:00 AM 0.18 0.00 - 0.70 x10E9/L Final   01/04/2022 09:00 AM 0.33 0.00 - 0.70 x10E9/L Final     Basophils Absolute   Date/Time Value Ref Range Status   09/10/2024 02:32 PM 0.03 0.00 - 0.10 x10*3/uL Final   01/18/2024 07:52 AM 0.03 0.00 - 0.10 x10*3/uL Final   01/05/2023 09:00 AM 0.02 0.00 - 0.10 x10E9/L Final   01/04/2022 09:00 AM 0.03 0.00 - 0.10 x10E9/L Final         Assessment/Plan       Elevated light chains with kappa 2.53 most consistent with inflammatory response particularly with normal immunoglobulins and no monoclonal protein.  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  Would defer work up for Leukopenia that is lifelong in asymptomatic person    OV 6 months to verify stability of all  Diagnoses and all orders for this visit:  Light chain disease (Multi)  -     Referral to Hematology and Oncology  -     CBC and Auto Differential; Future  -     Ferritin; Future  -     Immunoglobulins (IgG, IgA, IgM); Future  -     [place]/Lambda Free Light Chain, Serum; Future  -     Serum Protein Electrophoresis + Immunofixation; Future  -     Clinic Appointment Request Follow up; Future           Simran Escalera PA-C

## 2025-01-27 NOTE — PROGRESS NOTES
Patient here for a new pt visit with Simran Escalera for Dx of leukopenia   Patient here alone     Medications and Allergies reviewed and reconciled this visit.    No concerns or complaints noted at this time.     Pt reports appetite is  good.       Follow up per  PA  request.    Pt. reports availability and use of mychart, Reviewed this is a good place to communicate with the team as well as review labs and upcoming orders.     No barriers to education noted, patient agrees to current plan and verbalized understanding using teach back method.

## 2025-01-28 LAB
FERRITIN SERPL-MCNC: 660 NG/ML (ref 20–300)
IGA SERPL-MCNC: 182 MG/DL (ref 70–400)
IGG SERPL-MCNC: 1780 MG/DL (ref 700–1600)
IGM SERPL-MCNC: 32 MG/DL (ref 40–230)
PROT SERPL-MCNC: 8 G/DL (ref 6.4–8.2)

## 2025-01-29 DIAGNOSIS — R79.89 ELEVATED FERRITIN: Primary | ICD-10-CM

## 2025-01-29 LAB
ALBUMIN: 4.3 G/DL (ref 3.4–5)
ALPHA 1 GLOBULIN: 0.2 G/DL (ref 0.2–0.6)
ALPHA 2 GLOBULIN: 0.8 G/DL (ref 0.4–1.1)
BETA GLOBULIN: 1 G/DL (ref 0.5–1.2)
GAMMA GLOBULIN: 1.7 G/DL (ref 0.5–1.4)
IMMUNOFIXATION COMMENT: ABNORMAL
KAPPA LC SERPL-MCNC: 2.42 MG/DL (ref 0.33–1.94)
KAPPA LC/LAMBDA SER: 1.65 {RATIO} (ref 0.26–1.65)
LAMBDA LC SERPL-MCNC: 1.47 MG/DL (ref 0.57–2.63)
PATH REVIEW - SERUM IMMUNOFIXATION: ABNORMAL
PATH REVIEW-SERUM PROTEIN ELECTROPHORESIS: ABNORMAL
PROTEIN ELECTROPHORESIS COMMENT: ABNORMAL

## 2025-02-03 ENCOUNTER — DOCUMENTATION (OUTPATIENT)
Dept: HEMATOLOGY/ONCOLOGY | Facility: CLINIC | Age: 60
End: 2025-02-03
Payer: COMMERCIAL

## 2025-02-03 LAB
ELECTRONICALLY SIGNED BY: NORMAL
HFE GENE MUT TESTED BLD/T: NORMAL
HFE P.C282Y BLD/T QL: NORMAL
HFE P.H63D BLD/T QL: NORMAL

## 2025-02-03 NOTE — PROGRESS NOTES
LM calling regarding labs  Negative for hemachromatosis  Ferritan elevated likely due to some type of inflammation  Will watch immunoglobulins and light chains  Simran Escalera PA-C

## 2025-03-20 ENCOUNTER — OFFICE VISIT (OUTPATIENT)
Dept: PRIMARY CARE | Facility: CLINIC | Age: 60
End: 2025-03-20
Payer: COMMERCIAL

## 2025-03-20 VITALS
BODY MASS INDEX: 37.89 KG/M2 | HEART RATE: 73 BPM | SYSTOLIC BLOOD PRESSURE: 126 MMHG | HEIGHT: 68 IN | WEIGHT: 250 LBS | OXYGEN SATURATION: 97 % | DIASTOLIC BLOOD PRESSURE: 78 MMHG

## 2025-03-20 DIAGNOSIS — Z13.89 SCREENING FOR OBESITY: ICD-10-CM

## 2025-03-20 DIAGNOSIS — R36.1 HEMATOSPERMIA: Primary | Chronic | ICD-10-CM

## 2025-03-20 DIAGNOSIS — E66.09 CLASS 2 OBESITY DUE TO EXCESS CALORIES WITHOUT SERIOUS COMORBIDITY WITH BODY MASS INDEX (BMI) OF 38.0 TO 38.9 IN ADULT: ICD-10-CM

## 2025-03-20 DIAGNOSIS — E66.812 CLASS 2 OBESITY DUE TO EXCESS CALORIES WITHOUT SERIOUS COMORBIDITY WITH BODY MASS INDEX (BMI) OF 38.0 TO 38.9 IN ADULT: ICD-10-CM

## 2025-03-20 DIAGNOSIS — Z12.5 SCREENING FOR PROSTATE CANCER: ICD-10-CM

## 2025-03-20 DIAGNOSIS — J45.20 MILD INTERMITTENT ASTHMA, UNSPECIFIED WHETHER COMPLICATED (HHS-HCC): ICD-10-CM

## 2025-03-20 PROCEDURE — 3008F BODY MASS INDEX DOCD: CPT | Performed by: INTERNAL MEDICINE

## 2025-03-20 PROCEDURE — 99214 OFFICE O/P EST MOD 30 MIN: CPT | Performed by: INTERNAL MEDICINE

## 2025-03-20 ASSESSMENT — PATIENT HEALTH QUESTIONNAIRE - PHQ9
SUM OF ALL RESPONSES TO PHQ9 QUESTIONS 1 AND 2: 0
1. LITTLE INTEREST OR PLEASURE IN DOING THINGS: NOT AT ALL
SUM OF ALL RESPONSES TO PHQ9 QUESTIONS 1 AND 2: 0
2. FEELING DOWN, DEPRESSED OR HOPELESS: NOT AT ALL
2. FEELING DOWN, DEPRESSED OR HOPELESS: NOT AT ALL
1. LITTLE INTEREST OR PLEASURE IN DOING THINGS: NOT AT ALL

## 2025-03-20 ASSESSMENT — ENCOUNTER SYMPTOMS: CONSTITUTIONAL NEGATIVE: 1

## 2025-03-20 NOTE — PROGRESS NOTES
"Patient ID: Rachid Vargas is a 60 y.o. male who presents for Blood in semen.    /78   Pulse 73   Ht 1.727 m (5' 8\")   Wt 113 kg (250 lb)   SpO2 97%   BMI 38.01 kg/m²     HPI      Patient is here for evaluation of blood in the semen  No blood in the urine  No painful micturition  He never had blood in the semen before  No history of trauma or injury    Never had kidney stones        Subjective     Review of Systems   Constitutional: Negative.    All other systems reviewed and are negative.      Objective     Physical Exam  Vitals and nursing note reviewed.   Constitutional:       Appearance: Normal appearance. He is obese.   Neck:      Vascular: No carotid bruit.   Cardiovascular:      Rate and Rhythm: Normal rate and regular rhythm.      Pulses: Normal pulses.      Heart sounds: Normal heart sounds. No murmur heard.  Pulmonary:      Effort: Pulmonary effort is normal.      Breath sounds: Normal breath sounds.   Genitourinary:     Comments: GENITALIA NORMAL   NO INGUINAL ADENOPATHY   NO SCROTAL SWELLING     Musculoskeletal:      Right lower leg: No edema.      Left lower leg: No edema.   Skin:     Capillary Refill: Capillary refill takes more than 3 seconds.   Neurological:      General: No focal deficit present.      Mental Status: He is oriented to person, place, and time. Mental status is at baseline.   Psychiatric:         Mood and Affect: Mood normal.         Behavior: Behavior normal.         Thought Content: Thought content normal.         Judgment: Judgment normal.         Lab Results   Component Value Date    WBC 4.3 (L) 01/27/2025    HGB 14.5 01/27/2025    HCT 41.1 01/27/2025    MCV 93 01/27/2025     01/27/2025           Problem List Items Addressed This Visit       Asthma     RESOLVED          Screening for obesity    Class 2 obesity due to excess calories without serious comorbidity with body mass index (BMI) of 38.0 to 38.9 in adult     Other Visit Diagnoses       Hematospermia  " (Chronic)   -  Primary    Relevant Orders    Urinalysis with Reflex Microscopic    Screening for prostate cancer        Relevant Orders    Prostate Specific Antigen, Screen                 A/P         UA , PSA   ADVISED TO DRINK MORE FLUID   WILL LET YOU KNOW ABOUT THE TEST RESULT

## 2025-03-21 LAB
APPEARANCE UR: CLEAR
BILIRUB UR QL STRIP: NEGATIVE
COLOR UR: YELLOW
GLUCOSE UR QL STRIP: NEGATIVE
HGB UR QL STRIP: NEGATIVE
KETONES UR QL STRIP: NEGATIVE
LEUKOCYTE ESTERASE UR QL STRIP: NEGATIVE
NITRITE UR QL STRIP: NEGATIVE
PH UR STRIP: NORMAL [PH] (ref 5–8)
PROT UR QL STRIP: NEGATIVE
PSA SERPL-MCNC: 0.32 NG/ML
SP GR UR STRIP: 1.02 (ref 1–1.03)

## 2025-07-28 ENCOUNTER — LAB (OUTPATIENT)
Dept: LAB | Facility: CLINIC | Age: 60
End: 2025-07-28
Payer: COMMERCIAL

## 2025-07-28 ENCOUNTER — OFFICE VISIT (OUTPATIENT)
Dept: HEMATOLOGY/ONCOLOGY | Facility: CLINIC | Age: 60
End: 2025-07-28
Payer: COMMERCIAL

## 2025-07-28 VITALS
TEMPERATURE: 97.3 F | RESPIRATION RATE: 18 BRPM | OXYGEN SATURATION: 94 % | BODY MASS INDEX: 37.11 KG/M2 | HEART RATE: 66 BPM | WEIGHT: 244.05 LBS | DIASTOLIC BLOOD PRESSURE: 86 MMHG | SYSTOLIC BLOOD PRESSURE: 146 MMHG

## 2025-07-28 DIAGNOSIS — R76.8 ELEVATED SERUM IMMUNOGLOBULIN FREE LIGHT CHAIN LEVEL: ICD-10-CM

## 2025-07-28 DIAGNOSIS — D80.8 LIGHT CHAIN DISEASE (MULTI): ICD-10-CM

## 2025-07-28 DIAGNOSIS — R76.8 ELEVATED SERUM IMMUNOGLOBULIN FREE LIGHT CHAIN LEVEL: Primary | ICD-10-CM

## 2025-07-28 LAB
ALBUMIN SERPL BCP-MCNC: 4.3 G/DL (ref 3.4–5)
ALP SERPL-CCNC: 52 U/L (ref 33–136)
ALT SERPL W P-5'-P-CCNC: 31 U/L (ref 10–52)
ANION GAP SERPL CALC-SCNC: 12 MMOL/L (ref 10–20)
AST SERPL W P-5'-P-CCNC: 21 U/L (ref 9–39)
BASOPHILS # BLD AUTO: 0.03 X10*3/UL (ref 0–0.1)
BASOPHILS NFR BLD AUTO: 0.7 %
BILIRUB SERPL-MCNC: 0.4 MG/DL (ref 0–1.2)
BUN SERPL-MCNC: 13 MG/DL (ref 6–23)
CALCIUM SERPL-MCNC: 9.1 MG/DL (ref 8.6–10.3)
CHLORIDE SERPL-SCNC: 105 MMOL/L (ref 98–107)
CO2 SERPL-SCNC: 27 MMOL/L (ref 21–32)
CREAT SERPL-MCNC: 0.9 MG/DL (ref 0.5–1.3)
EGFRCR SERPLBLD CKD-EPI 2021: >90 ML/MIN/1.73M*2
EOSINOPHIL # BLD AUTO: 0.25 X10*3/UL (ref 0–0.7)
EOSINOPHIL NFR BLD AUTO: 5.7 %
ERYTHROCYTE [DISTWIDTH] IN BLOOD BY AUTOMATED COUNT: 13.4 % (ref 11.5–14.5)
GLUCOSE SERPL-MCNC: 100 MG/DL (ref 74–99)
HCT VFR BLD AUTO: 41.7 % (ref 41–52)
HGB BLD-MCNC: 14.3 G/DL (ref 13.5–17.5)
IGA SERPL-MCNC: 180 MG/DL (ref 70–400)
IGG SERPL-MCNC: 1820 MG/DL (ref 700–1600)
IGM SERPL-MCNC: 33 MG/DL (ref 40–230)
IMM GRANULOCYTES # BLD AUTO: 0.01 X10*3/UL (ref 0–0.7)
IMM GRANULOCYTES NFR BLD AUTO: 0.2 % (ref 0–0.9)
LYMPHOCYTES # BLD AUTO: 2.23 X10*3/UL (ref 1.2–4.8)
LYMPHOCYTES NFR BLD AUTO: 51.3 %
MCH RBC QN AUTO: 32.3 PG (ref 26–34)
MCHC RBC AUTO-ENTMCNC: 34.3 G/DL (ref 32–36)
MCV RBC AUTO: 94 FL (ref 80–100)
MONOCYTES # BLD AUTO: 0.51 X10*3/UL (ref 0.1–1)
MONOCYTES NFR BLD AUTO: 11.7 %
NEUTROPHILS # BLD AUTO: 1.32 X10*3/UL (ref 1.2–7.7)
NEUTROPHILS NFR BLD AUTO: 30.4 %
NRBC BLD-RTO: 0 /100 WBCS (ref 0–0)
PLATELET # BLD AUTO: 190 X10*3/UL (ref 150–450)
POTASSIUM SERPL-SCNC: 3.9 MMOL/L (ref 3.5–5.3)
PROT SERPL-MCNC: 7.3 G/DL (ref 6.4–8.2)
PROT SERPL-MCNC: 7.4 G/DL (ref 6.4–8.2)
RBC # BLD AUTO: 4.43 X10*6/UL (ref 4.5–5.9)
SODIUM SERPL-SCNC: 140 MMOL/L (ref 136–145)
WBC # BLD AUTO: 4.4 X10*3/UL (ref 4.4–11.3)

## 2025-07-28 PROCEDURE — 36415 COLL VENOUS BLD VENIPUNCTURE: CPT

## 2025-07-28 PROCEDURE — 99214 OFFICE O/P EST MOD 30 MIN: CPT | Performed by: NURSE PRACTITIONER

## 2025-07-28 PROCEDURE — 84165 PROTEIN E-PHORESIS SERUM: CPT

## 2025-07-28 PROCEDURE — 82784 ASSAY IGA/IGD/IGG/IGM EACH: CPT

## 2025-07-28 PROCEDURE — 85025 COMPLETE CBC W/AUTO DIFF WBC: CPT

## 2025-07-28 PROCEDURE — 83521 IG LIGHT CHAINS FREE EACH: CPT

## 2025-07-28 PROCEDURE — 80053 COMPREHEN METABOLIC PANEL: CPT

## 2025-07-28 PROCEDURE — 84155 ASSAY OF PROTEIN SERUM: CPT | Mod: 59

## 2025-07-28 ASSESSMENT — PAIN SCALES - GENERAL: PAINLEVEL_OUTOF10: 1

## 2025-07-28 NOTE — PROGRESS NOTES
"Patient ID: Rachid Vargas is a 60 y.o. male.  Referring Physician: Simran Escalera PA-C  [address]  [address]  [place] [address]  Primary Care Provider: No Assigned PCP Generic Provider, MD  Visit Type: Follow Up      Subjective    HPI  61yo referral for \"light chain\" with labs 10/10/24 kappa 2.53, ratio 1.72. CESILIA negative, ferritan 754, iron 100, % sat 16 and B12 of 495. Prior to that 9/10/24 WBC 3.8, hgb 13.8 and plt 175. He states his WBC has been low for over 30 years. He is completely asymptomatic with good energy, no weight loss, no headaches, no night sweats and no recurrent infections. He denies back and bone pain. He denies cough, congestion, abdominal pain, bloating, diarrhea or constipation.   Current labs WBC 4.4, hgb 14.3 and plt 190.   He has complaint of left elbow overuse injury at lateral epicodyle after trimming hedges and playing golf two weeks ago.   It is helped by advil.     Review of Systems - Oncology Other than left elbow pain, completely asymptomatic    Objective   BSA: 2.31 meters squared  /86 (BP Location: Left arm, Patient Position: Sitting, BP Cuff Size: Adult long)   Pulse 66   Temp 36.3 °C (97.3 °F) (Temporal)   Resp 18   Wt 111 kg (244 lb 0.8 oz)   SpO2 94%   BMI 37.11 kg/m²      has a past medical history of Other specified health status.   has a past surgical history that includes Other surgical history (01/04/2022).  Family History[1]      Rachid Vargas  reports that he has been smoking cigars. He has never used smokeless tobacco.  He  reports that he does not currently use alcohol after a past usage of about 2.0 standard drinks of alcohol per week.  He  reports no history of drug use.    Physical Exam  Constitutional:       Appearance: Normal appearance.     Eyes:      Conjunctiva/sclera: Conjunctivae normal.       Cardiovascular:      Rate and Rhythm: Normal rate and regular rhythm.      Pulses: Normal pulses.      Heart sounds: Normal heart sounds. "   Pulmonary:      Effort: Pulmonary effort is normal. No respiratory distress.      Breath sounds: Normal breath sounds. No stridor. No wheezing.   Abdominal:      General: There is no distension.      Palpations: There is no mass.      Tenderness: There is no abdominal tenderness.     Musculoskeletal:         General: Tenderness present. No swelling.      Comments: Pain to palpation left lateral epicondyle   Lymphadenopathy:      Cervical: No cervical adenopathy.     Skin:     Coloration: Skin is not jaundiced or pale.      Findings: No bruising.     Neurological:      Motor: No weakness.       WBC   Date/Time Value Ref Range Status   07/28/2025 08:04 AM 4.4 4.4 - 11.3 x10*3/uL Final   01/27/2025 02:29 PM 4.3 (L) 4.4 - 11.3 x10*3/uL Final   09/10/2024 02:32 PM 3.8 (L) 4.4 - 11.3 x10*3/uL Final     nRBC   Date Value Ref Range Status   07/28/2025 0.0 0.0 - 0.0 /100 WBCs Final   01/27/2025 0.0 0.0 - 0.0 /100 WBCs Final   09/10/2024 0.0 0.0 - 0.0 /100 WBCs Final     RBC   Date Value Ref Range Status   07/28/2025 4.43 (L) 4.50 - 5.90 x10*6/uL Final   01/27/2025 4.42 (L) 4.50 - 5.90 x10*6/uL Final   09/10/2024 4.33 (L) 4.50 - 5.90 x10*6/uL Final     Hemoglobin   Date Value Ref Range Status   07/28/2025 14.3 13.5 - 17.5 g/dL Final   01/27/2025 14.5 13.5 - 17.5 g/dL Final   09/10/2024 13.8 13.5 - 17.5 g/dL Final     Hematocrit   Date Value Ref Range Status   07/28/2025 41.7 41.0 - 52.0 % Final   01/27/2025 41.1 41.0 - 52.0 % Final   09/10/2024 40.8 (L) 41.0 - 52.0 % Final     MCV   Date/Time Value Ref Range Status   07/28/2025 08:04 AM 94 80 - 100 fL Final   01/27/2025 02:29 PM 93 80 - 100 fL Final   09/10/2024 02:32 PM 94 80 - 100 fL Final     MCH   Date/Time Value Ref Range Status   07/28/2025 08:04 AM 32.3 26.0 - 34.0 pg Final   01/27/2025 02:29 PM 32.8 26.0 - 34.0 pg Final   09/10/2024 02:32 PM 31.9 26.0 - 34.0 pg Final     MCHC   Date/Time Value Ref Range Status   07/28/2025 08:04 AM 34.3 32.0 - 36.0 g/dL Final  "  01/27/2025 02:29 PM 35.3 32.0 - 36.0 g/dL Final   09/10/2024 02:32 PM 33.8 32.0 - 36.0 g/dL Final     RDW   Date/Time Value Ref Range Status   07/28/2025 08:04 AM 13.4 11.5 - 14.5 % Final   01/27/2025 02:29 PM 13.1 11.5 - 14.5 % Final   09/10/2024 02:32 PM 13.2 11.5 - 14.5 % Final     Platelets   Date/Time Value Ref Range Status   07/28/2025 08:04  150 - 450 x10*3/uL Final   01/27/2025 02:29  150 - 450 x10*3/uL Final   09/10/2024 02:32  150 - 450 x10*3/uL Final     No results found for: \"MPV\"  Neutrophils %   Date/Time Value Ref Range Status   07/28/2025 08:04 AM 30.4 40.0 - 80.0 % Final   01/27/2025 02:29 PM 36.3 40.0 - 80.0 % Final   09/10/2024 02:32 PM 30.6 40.0 - 80.0 % Final     Immature Granulocytes %, Automated   Date/Time Value Ref Range Status   07/28/2025 08:04 AM 0.2 0.0 - 0.9 % Final     Comment:     Immature Granulocyte Count (IG) includes promyelocytes, myelocytes and metamyelocytes but does not include bands. Percent differential counts (%) should be interpreted in the context of the absolute cell counts (cells/UL).   01/27/2025 02:29 PM 0.0 0.0 - 0.9 % Final     Comment:     Immature Granulocyte Count (IG) includes promyelocytes, myelocytes and metamyelocytes but does not include bands. Percent differential counts (%) should be interpreted in the context of the absolute cell counts (cells/UL).   09/10/2024 02:32 PM 0.0 0.0 - 0.9 % Final     Comment:     Immature Granulocyte Count (IG) includes promyelocytes, myelocytes and metamyelocytes but does not include bands. Percent differential counts (%) should be interpreted in the context of the absolute cell counts (cells/UL).     Lymphocytes %   Date/Time Value Ref Range Status   07/28/2025 08:04 AM 51.3 13.0 - 44.0 % Final   01/27/2025 02:29 PM 48.6 13.0 - 44.0 % Final   09/10/2024 02:32 PM 52.4 13.0 - 44.0 % Final     Monocytes %   Date/Time Value Ref Range Status   07/28/2025 08:04 AM 11.7 2.0 - 10.0 % Final   01/27/2025 02:29 PM " 10.2 2.0 - 10.0 % Final   09/10/2024 02:32 PM 11.4 2.0 - 10.0 % Final     Eosinophils %   Date/Time Value Ref Range Status   07/28/2025 08:04 AM 5.7 0.0 - 6.0 % Final   01/27/2025 02:29 PM 4.2 0.0 - 6.0 % Final   09/10/2024 02:32 PM 4.8 0.0 - 6.0 % Final     Basophils %   Date/Time Value Ref Range Status   07/28/2025 08:04 AM 0.7 0.0 - 2.0 % Final   01/27/2025 02:29 PM 0.7 0.0 - 2.0 % Final   09/10/2024 02:32 PM 0.8 0.0 - 2.0 % Final     Neutrophils Absolute   Date/Time Value Ref Range Status   07/28/2025 08:04 AM 1.32 1.20 - 7.70 x10*3/uL Final     Comment:     Percent differential counts (%) should be interpreted in the context of the absolute cell counts (cells/uL).   01/27/2025 02:29 PM 1.57 1.20 - 7.70 x10*3/uL Final     Comment:     Percent differential counts (%) should be interpreted in the context of the absolute cell counts (cells/uL).   09/10/2024 02:32 PM 1.15 (L) 1.20 - 7.70 x10*3/uL Final     Comment:     Percent differential counts (%) should be interpreted in the context of the absolute cell counts (cells/uL).     Immature Granulocytes Absolute, Automated   Date/Time Value Ref Range Status   07/28/2025 08:04 AM 0.01 0.00 - 0.70 x10*3/uL Final   01/27/2025 02:29 PM 0.00 0.00 - 0.70 x10*3/uL Final   09/10/2024 02:32 PM 0.00 0.00 - 0.70 x10*3/uL Final     Lymphocytes Absolute   Date/Time Value Ref Range Status   07/28/2025 08:04 AM 2.23 1.20 - 4.80 x10*3/uL Final   01/27/2025 02:29 PM 2.10 1.20 - 4.80 x10*3/uL Final   09/10/2024 02:32 PM 1.97 1.20 - 4.80 x10*3/uL Final     Monocytes Absolute   Date/Time Value Ref Range Status   07/28/2025 08:04 AM 0.51 0.10 - 1.00 x10*3/uL Final   01/27/2025 02:29 PM 0.44 0.10 - 1.00 x10*3/uL Final   09/10/2024 02:32 PM 0.43 0.10 - 1.00 x10*3/uL Final     Eosinophils Absolute   Date/Time Value Ref Range Status   07/28/2025 08:04 AM 0.25 0.00 - 0.70 x10*3/uL Final   01/27/2025 02:29 PM 0.18 0.00 - 0.70 x10*3/uL Final   09/10/2024 02:32 PM 0.18 0.00 - 0.70 x10*3/uL Final      Basophils Absolute   Date/Time Value Ref Range Status   07/28/2025 08:04 AM 0.03 0.00 - 0.10 x10*3/uL Final   01/27/2025 02:29 PM 0.03 0.00 - 0.10 x10*3/uL Final   09/10/2024 02:32 PM 0.03 0.00 - 0.10 x10*3/uL Final         Assessment/Plan    Elevated light chains with kappa 2.53 most consistent with inflammatory response particularly with normal immunoglobulins and no monoclonal protein.  Will call with results and recheck in 6 months.  I expect may be higher due to inflammation from left lateral epicondyle injury.   Discussed 10% of abnormal SPEP can transform to multiple myeloma     Leukopenia lifelong without symptoms, lbut normal today     OV 6 months to verify stability of all     Diagnoses and all orders for this visit:  Elevated serum immunoglobulin free light chain level  -     CBC and Auto Differential; Future  -     Comprehensive Metabolic Panel; Future  -     Immunoglobulins (IgG, IgA, IgM); Future  -     [place]/Lambda Free Light Chain, Serum; Future  -     Serum Protein Electrophoresis + Immunofixation; Future  -     CBC and Auto Differential; Future  -     Comprehensive Metabolic Panel; Future  -     Immunoglobulins (IgG, IgA, IgM); Future  -     [place]/Lambda Free Light Chain, Serum; Future  -     Serum Protein Electrophoresis + Immunofixation; Future  -     Ferritin; Future  -     Iron and TIBC; Future  -     Hemochromatosis Mutation Analysis; Future  -     Clinic Appointment Request Follow up; Future  Light chain disease (Multi)  -     Clinic Appointment Request Follow up           Simran Escalera PA-C                                [1] No family history on file.

## 2025-07-29 LAB
KAPPA LC SERPL-MCNC: 2.31 MG/DL (ref 0.33–1.94)
KAPPA LC/LAMBDA SER: 1.55 {RATIO} (ref 0.26–1.65)
LAMBDA LC SERPL-MCNC: 1.49 MG/DL (ref 0.57–2.63)

## 2025-08-03 LAB
ALBUMIN: 4.1 G/DL (ref 3.4–5)
ALPHA 1 GLOBULIN: 0.2 G/DL (ref 0.2–0.6)
ALPHA 2 GLOBULIN: 0.7 G/DL (ref 0.4–1.1)
BETA GLOBULIN: 0.8 G/DL (ref 0.5–1.2)
GAMMA GLOBULIN: 1.6 G/DL (ref 0.5–1.4)
IMMUNOFIXATION COMMENT: ABNORMAL
PATH REVIEW - SERUM IMMUNOFIXATION: ABNORMAL
PATH REVIEW-SERUM PROTEIN ELECTROPHORESIS: ABNORMAL
PROTEIN ELECTROPHORESIS COMMENT: ABNORMAL

## 2025-08-05 ENCOUNTER — DOCUMENTATION (OUTPATIENT)
Dept: HEMATOLOGY/ONCOLOGY | Facility: CLINIC | Age: 60
End: 2025-08-05
Payer: COMMERCIAL

## 2025-08-05 NOTE — PROGRESS NOTES
Google answering machine  This is Simran Escalera calling about lab results  LM  SPEP IgG is 1820, hgb, crt and calcium normal  Will follow at next visit  Simran Escalera PA-C